# Patient Record
Sex: FEMALE | Race: WHITE | NOT HISPANIC OR LATINO | URBAN - METROPOLITAN AREA
[De-identification: names, ages, dates, MRNs, and addresses within clinical notes are randomized per-mention and may not be internally consistent; named-entity substitution may affect disease eponyms.]

---

## 2020-02-20 ENCOUNTER — OFFICE (OUTPATIENT)
Dept: URBAN - METROPOLITAN AREA CLINIC 33 | Facility: CLINIC | Age: 60
End: 2020-02-20
Payer: OTHER GOVERNMENT

## 2020-02-20 VITALS
HEIGHT: 61 IN | HEART RATE: 64 BPM | SYSTOLIC BLOOD PRESSURE: 133 MMHG | TEMPERATURE: 97.7 F | WEIGHT: 200 LBS | DIASTOLIC BLOOD PRESSURE: 76 MMHG

## 2020-02-20 DIAGNOSIS — R10.11 RIGHT UPPER QUADRANT PAIN: ICD-10-CM

## 2020-02-20 DIAGNOSIS — Z12.11 ENCOUNTER FOR SCREENING FOR MALIGNANT NEOPLASM OF COLON: ICD-10-CM

## 2020-02-20 PROCEDURE — 99203 OFFICE O/P NEW LOW 30 MIN: CPT

## 2020-02-20 NOTE — SERVICEHPINOTES
NUVIA DA SILVA   is a   59  female who presents with RUQ pain and diarrhea. Developed these symptoms about 3 weeks ago. Reports a chronic hx of stomach issues, this was different than regular pain. BRSeen by pcp and had blood work and RUQ u/s. Slightly elevated lipase, 86 U/L, amylase 46, AST 24, ALT 30,  and total bili 0.2, WBC 14.5. She will repeat the lipase and wbc per pcp. BRBeen on low fat and low carb diet. Now, the pain has resolved but still has occasional diarrhea. BRMoves bowel once daily on BSS type 4. Denies blood in stool, melena or lower abdominal pain. BRThe pain is at RUQ. The pain was on both empty stomach and after eating. BRDiarrhea followed after RUQ pain. + Nausea with the pain. Denies vomiting or dysphagia.  BR+ acid reflux, currently controls with diet. Weight loss about 6-10 lbs since 3 weeks ago. Family hx unknown. Takes aspirin 81 mg daily. Denies  hx of cardiovascular disease. BR

## 2020-02-27 ENCOUNTER — OFFICE (OUTPATIENT)
Dept: URBAN - METROPOLITAN AREA PATHOLOGY 17 | Facility: PATHOLOGY | Age: 60
End: 2020-02-27

## 2020-02-27 ENCOUNTER — OFFICE (OUTPATIENT)
Dept: URBAN - METROPOLITAN AREA PATHOLOGY 17 | Facility: PATHOLOGY | Age: 60
End: 2020-02-27
Payer: OTHER GOVERNMENT

## 2020-02-27 ENCOUNTER — OFFICE (OUTPATIENT)
Dept: URBAN - METROPOLITAN AREA CLINIC 32 | Facility: CLINIC | Age: 60
End: 2020-02-27
Payer: OTHER GOVERNMENT

## 2020-02-27 VITALS
SYSTOLIC BLOOD PRESSURE: 89 MMHG | SYSTOLIC BLOOD PRESSURE: 97 MMHG | DIASTOLIC BLOOD PRESSURE: 60 MMHG | SYSTOLIC BLOOD PRESSURE: 139 MMHG | DIASTOLIC BLOOD PRESSURE: 48 MMHG | DIASTOLIC BLOOD PRESSURE: 68 MMHG | OXYGEN SATURATION: 94 % | RESPIRATION RATE: 12 BRPM | TEMPERATURE: 98.1 F | RESPIRATION RATE: 15 BRPM | SYSTOLIC BLOOD PRESSURE: 102 MMHG | DIASTOLIC BLOOD PRESSURE: 55 MMHG | HEIGHT: 61 IN | HEART RATE: 71 BPM | OXYGEN SATURATION: 99 % | RESPIRATION RATE: 11 BRPM | OXYGEN SATURATION: 100 % | SYSTOLIC BLOOD PRESSURE: 125 MMHG | WEIGHT: 200 LBS | DIASTOLIC BLOOD PRESSURE: 73 MMHG | DIASTOLIC BLOOD PRESSURE: 63 MMHG | SYSTOLIC BLOOD PRESSURE: 121 MMHG | HEART RATE: 64 BPM | RESPIRATION RATE: 16 BRPM | OXYGEN SATURATION: 97 % | TEMPERATURE: 98.6 F | HEART RATE: 68 BPM

## 2020-02-27 DIAGNOSIS — K31.7 POLYP OF STOMACH AND DUODENUM: ICD-10-CM

## 2020-02-27 DIAGNOSIS — K20.8 OTHER ESOPHAGITIS: ICD-10-CM

## 2020-02-27 DIAGNOSIS — R11.0 NAUSEA: ICD-10-CM

## 2020-02-27 DIAGNOSIS — R10.11 RIGHT UPPER QUADRANT PAIN: ICD-10-CM

## 2020-02-27 DIAGNOSIS — R10.13 EPIGASTRIC PAIN: ICD-10-CM

## 2020-02-27 DIAGNOSIS — K29.60 OTHER GASTRITIS WITHOUT BLEEDING: ICD-10-CM

## 2020-02-27 PROBLEM — K31.89 OTHER DISEASES OF STOMACH AND DUODENUM: Status: ACTIVE | Noted: 2020-02-27

## 2020-02-27 PROBLEM — K20.9 ESOPHAGITIS, UNSPECIFIED: Status: ACTIVE | Noted: 2020-02-27

## 2020-02-27 PROBLEM — K44.9 DIAPHRAGMATIC HERNIA WITHOUT OBSTRUCTION OR GANGRENE: Status: ACTIVE | Noted: 2020-02-27

## 2020-02-27 PROCEDURE — 88313 SPECIAL STAINS GROUP 2: CPT

## 2020-02-27 PROCEDURE — 43239 EGD BIOPSY SINGLE/MULTIPLE: CPT

## 2020-02-27 PROCEDURE — 88305 TISSUE EXAM BY PATHOLOGIST: CPT

## 2020-02-27 PROCEDURE — 88342 IMHCHEM/IMCYTCHM 1ST ANTB: CPT

## 2020-02-27 PROCEDURE — 00002: CPT

## 2021-12-29 ENCOUNTER — TELEHEALTH PROVIDED OTHER THAN IN PATIENT'S HOME (OUTPATIENT)
Dept: URBAN - METROPOLITAN AREA TELEHEALTH 3 | Facility: TELEHEALTH | Age: 61
End: 2021-12-29
Payer: COMMERCIAL

## 2021-12-29 VITALS — HEIGHT: 61 IN | WEIGHT: 213 LBS

## 2021-12-29 DIAGNOSIS — K92.1 MELENA: ICD-10-CM

## 2021-12-29 DIAGNOSIS — R10.31 RIGHT LOWER QUADRANT PAIN: ICD-10-CM

## 2021-12-29 DIAGNOSIS — K52.9 NONINFECTIVE GASTROENTERITIS AND COLITIS, UNSPECIFIED: ICD-10-CM

## 2021-12-29 PROCEDURE — 99214 OFFICE O/P EST MOD 30 MIN: CPT | Mod: 95 | Performed by: INTERNAL MEDICINE

## 2021-12-29 NOTE — SERVICEHPINOTES
PATIENT VERIFIED BY DATE OF BIRTH AND NAME. Patient has been consented for this telecommunication visit.
br
br Pt presents for evaluation of diarrhea and suspected IBS. Chronic issues, though worse recently. Specifically, diarrhea has been worse over past few weeks. No specific trigger, though otezla (psoriasis) recently added to medical regimen. Normal BM initially, then every 20-30 minutes afterwards lasting a few hours. Loose stool consistency variable volume. No blood in stools, but has had black (formed) stools on rare occasion. No mucous in stools. Urgency with BMs, sometimes has tenesmus. No nocturnal BM. Can be worse after PO intake, no specific food triggers though. Sometimes worse with stress. Has tried bland diet, no specific improvement hasn't tried much else. br
br Associated positive symptoms: diminished appetite, belching, increased abdominal noise, lower abdominal discomfort, dyspepsia, nausea with retching (no vomiting)br Negative symptoms: no fevers, chills, night sweats, weight loss.br
br EGD 2/2020 showed a hiatal hernia and mild, non-erosive gastritis (confirmed on biopsies) biopsies from normal appearing duodenum were normal.
br Labs 2/2020: minimal elevation in lipase and alk phos (just above ULN), otherwise normal CMP CBC showed slight leukocytosis with increased neutrophils and absolute eosinophil count.
br HIDA w/ CCK 2/2020 was normal.
br MABEL US 2/2020 showed hepatic steatosis but otherwise was normal.br
br Colonoscopy 5/2010 showed internal hemorrhoids but was otherwise unremarkable biopsies taken from ileum showed nonspecific chronic ileitis (not IBD).br
br 10-point ROS completed with patient, pertinent positives/negatives outlined above.

## 2022-01-11 LAB
C-REACTIVE PROTEIN: 15.2 MG/L — HIGH (ref ?–8)
CBC (INCLUDES DIFF/PLT): ABSOLUTE BAND NEUTROPHILS: NORMAL CELLS/UL
CBC (INCLUDES DIFF/PLT): ABSOLUTE BASOPHILS: 78 CELLS/UL (ref 0–200)
CBC (INCLUDES DIFF/PLT): ABSOLUTE BLASTS: NORMAL CELLS/UL
CBC (INCLUDES DIFF/PLT): ABSOLUTE EOSINOPHILS: 245 CELLS/UL (ref 15–500)
CBC (INCLUDES DIFF/PLT): ABSOLUTE LYMPHOCYTES: 3146 CELLS/UL (ref 850–3900)
CBC (INCLUDES DIFF/PLT): ABSOLUTE METAMYELOCYTES: NORMAL CELLS/UL
CBC (INCLUDES DIFF/PLT): ABSOLUTE MONOCYTES: 892 CELLS/UL (ref 200–950)
CBC (INCLUDES DIFF/PLT): ABSOLUTE MYELOCYTES: NORMAL CELLS/UL
CBC (INCLUDES DIFF/PLT): ABSOLUTE NEUTROPHILS: 5439 CELLS/UL (ref 1500–7800)
CBC (INCLUDES DIFF/PLT): ABSOLUTE NUCLEATED RBC: NORMAL CELLS/UL
CBC (INCLUDES DIFF/PLT): ABSOLUTE PROMYELOCYTES: NORMAL CELLS/UL
CBC (INCLUDES DIFF/PLT): BAND NEUTROPHILS: NORMAL %
CBC (INCLUDES DIFF/PLT): BASOPHILS: 0.8 %
CBC (INCLUDES DIFF/PLT): BLASTS: NORMAL %
CBC (INCLUDES DIFF/PLT): COMMENT(S): NORMAL
CBC (INCLUDES DIFF/PLT): EOSINOPHILS: 2.5 %
CBC (INCLUDES DIFF/PLT): HEMATOCRIT: 38.3 % (ref 35–45)
CBC (INCLUDES DIFF/PLT): HEMOGLOBIN: 12.7 G/DL (ref 11.7–15.5)
CBC (INCLUDES DIFF/PLT): LYMPHOCYTES: 32.1 %
CBC (INCLUDES DIFF/PLT): MCH: 28.9 PG (ref 27–33)
CBC (INCLUDES DIFF/PLT): MCHC: 33.2 G/DL (ref 32–36)
CBC (INCLUDES DIFF/PLT): MCV: 87.2 FL (ref 80–100)
CBC (INCLUDES DIFF/PLT): METAMYELOCYTES: NORMAL %
CBC (INCLUDES DIFF/PLT): MONOCYTES: 9.1 %
CBC (INCLUDES DIFF/PLT): MPV: 10 FL (ref 7.5–12.5)
CBC (INCLUDES DIFF/PLT): MYELOCYTES: NORMAL %
CBC (INCLUDES DIFF/PLT): NEUTROPHILS: 55.5 %
CBC (INCLUDES DIFF/PLT): NUCLEATED RBC: NORMAL /100 WBC
CBC (INCLUDES DIFF/PLT): PLATELET COUNT: 268 THOUSAND/UL (ref 140–400)
CBC (INCLUDES DIFF/PLT): PROMYELOCYTES: NORMAL %
CBC (INCLUDES DIFF/PLT): RDW: 12.7 % (ref 11–15)
CBC (INCLUDES DIFF/PLT): REACTIVE LYMPHOCYTES: NORMAL %
CBC (INCLUDES DIFF/PLT): RED BLOOD CELL COUNT: 4.39 MILLION/UL (ref 3.8–5.1)
CBC (INCLUDES DIFF/PLT): WHITE BLOOD CELL COUNT: 9.8 THOUSAND/UL (ref 3.8–10.8)
COMPREHENSIVE METABOLIC PANEL: ALBUMIN/GLOBULIN RATIO: 1.8 (CALC) (ref 1–2.5)
COMPREHENSIVE METABOLIC PANEL: ALBUMIN: 4.6 G/DL (ref 3.6–5.1)
COMPREHENSIVE METABOLIC PANEL: ALKALINE PHOSPHATASE: 76 U/L (ref 37–153)
COMPREHENSIVE METABOLIC PANEL: ALT: 51 U/L — HIGH (ref 6–29)
COMPREHENSIVE METABOLIC PANEL: AST: 36 U/L — HIGH (ref 10–35)
COMPREHENSIVE METABOLIC PANEL: BILIRUBIN, TOTAL: 0.4 MG/DL (ref 0.2–1.2)
COMPREHENSIVE METABOLIC PANEL: BUN/CREATININE RATIO: (no result) (CALC)
COMPREHENSIVE METABOLIC PANEL: CALCIUM: 9.9 MG/DL (ref 8.6–10.4)
COMPREHENSIVE METABOLIC PANEL: CARBON DIOXIDE: 30 MMOL/L (ref 20–32)
COMPREHENSIVE METABOLIC PANEL: CHLORIDE: 97 MMOL/L — LOW (ref 98–110)
COMPREHENSIVE METABOLIC PANEL: CREATININE: 0.76 MG/DL (ref 0.5–0.99)
COMPREHENSIVE METABOLIC PANEL: EGFR AFRICAN AMERICAN: 97 ML/MIN/1.73M2 (ref 60–?)
COMPREHENSIVE METABOLIC PANEL: EGFR NON-AFR. AMERICAN: 83 ML/MIN/1.73M2 (ref 60–?)
COMPREHENSIVE METABOLIC PANEL: GLOBULIN: 2.5 G/DL (CALC) (ref 1.9–3.7)
COMPREHENSIVE METABOLIC PANEL: GLUCOSE: 149 MG/DL — HIGH (ref 65–139)
COMPREHENSIVE METABOLIC PANEL: POTASSIUM: 3.9 MMOL/L (ref 3.5–5.3)
COMPREHENSIVE METABOLIC PANEL: PROTEIN, TOTAL: 7.1 G/DL (ref 6.1–8.1)
COMPREHENSIVE METABOLIC PANEL: SODIUM: 138 MMOL/L (ref 135–146)
COMPREHENSIVE METABOLIC PANEL: UREA NITROGEN (BUN): 15 MG/DL (ref 7–25)
LIPASE: 58 U/L (ref 7–60)

## 2022-01-25 LAB
CALPROTECTIN, STOOL: 15 MCG/G
OVA AND PARASITES, CONC AND PERM SMEAR: (no result)
SALMONELLA/SHIGELLA CULT, CAMPY EIA AND SHIGA TOXIN W/RFL E. COLI O157 CULT: CAMPYLOBACTER SPP. AG,EIA: (no result)
SALMONELLA/SHIGELLA CULT, CAMPY EIA AND SHIGA TOXIN W/RFL E. COLI O157 CULT: SALMONELLA AND SHIGELLA, CULTURE: (no result)
SALMONELLA/SHIGELLA CULT, CAMPY EIA AND SHIGA TOXIN W/RFL E. COLI O157 CULT: SHIGA TOXINS, EIA W/RFL TO E.COLI O157 CULTURE: (no result)

## 2022-02-01 ENCOUNTER — OFFICE (OUTPATIENT)
Dept: URBAN - METROPOLITAN AREA PATHOLOGY 18 | Facility: PATHOLOGY | Age: 62
End: 2022-02-01
Payer: COMMERCIAL

## 2022-02-01 ENCOUNTER — OFFICE (OUTPATIENT)
Dept: URBAN - METROPOLITAN AREA CLINIC 30 | Facility: CLINIC | Age: 62
End: 2022-02-01
Payer: COMMERCIAL

## 2022-02-01 VITALS
RESPIRATION RATE: 23 BRPM | DIASTOLIC BLOOD PRESSURE: 84 MMHG | OXYGEN SATURATION: 96 % | DIASTOLIC BLOOD PRESSURE: 98 MMHG | HEART RATE: 64 BPM | RESPIRATION RATE: 19 BRPM | HEART RATE: 69 BPM | HEART RATE: 68 BPM | SYSTOLIC BLOOD PRESSURE: 144 MMHG | DIASTOLIC BLOOD PRESSURE: 59 MMHG | DIASTOLIC BLOOD PRESSURE: 85 MMHG | RESPIRATION RATE: 8 BRPM | SYSTOLIC BLOOD PRESSURE: 180 MMHG | RESPIRATION RATE: 18 BRPM | SYSTOLIC BLOOD PRESSURE: 192 MMHG | SYSTOLIC BLOOD PRESSURE: 142 MMHG | HEART RATE: 80 BPM | TEMPERATURE: 98.8 F | DIASTOLIC BLOOD PRESSURE: 90 MMHG | HEART RATE: 77 BPM | OXYGEN SATURATION: 100 % | SYSTOLIC BLOOD PRESSURE: 166 MMHG | DIASTOLIC BLOOD PRESSURE: 71 MMHG | OXYGEN SATURATION: 97 % | HEART RATE: 78 BPM | RESPIRATION RATE: 16 BRPM | HEIGHT: 61 IN | WEIGHT: 206 LBS | SYSTOLIC BLOOD PRESSURE: 173 MMHG | RESPIRATION RATE: 17 BRPM | DIASTOLIC BLOOD PRESSURE: 58 MMHG | OXYGEN SATURATION: 95 %

## 2022-02-01 DIAGNOSIS — K57.30 DIVERTICULOSIS OF LARGE INTESTINE WITHOUT PERFORATION OR ABS: ICD-10-CM

## 2022-02-01 DIAGNOSIS — D12.8 BENIGN NEOPLASM OF RECTUM: ICD-10-CM

## 2022-02-01 DIAGNOSIS — R19.7 DIARRHEA, UNSPECIFIED: ICD-10-CM

## 2022-02-01 PROBLEM — K62.1 RECTAL POLYP: Status: ACTIVE | Noted: 2022-02-01

## 2022-02-01 PROCEDURE — 88305 TISSUE EXAM BY PATHOLOGIST: CPT | Performed by: PATHOLOGY

## 2022-02-01 NOTE — SERVICEHPINOTES
Pt presents for colonoscopy due to chronic diarrhea. Previous colonoscopy 5/2010 showed internal hemorrhoids but was otherwise unremarkable biopsies taken from ileum showed nonspecific chronic ileitis (not IBD). No family history of IBD or colon cancer.

## 2022-10-26 ENCOUNTER — TELEHEALTH PROVIDED IN PATIENT'S HOME (OUTPATIENT)
Dept: URBAN - METROPOLITAN AREA TELEHEALTH 12 | Facility: TELEHEALTH | Age: 62
End: 2022-10-26
Payer: COMMERCIAL

## 2022-10-26 VITALS — WEIGHT: 193 LBS | HEIGHT: 61 IN

## 2022-10-26 DIAGNOSIS — R11.2 NAUSEA WITH VOMITING, UNSPECIFIED: ICD-10-CM

## 2022-10-26 DIAGNOSIS — K21.00 GASTRO-ESOPHAGEAL REFLUX DISEASE WITH ESOPHAGITIS, WITHOUT B: ICD-10-CM

## 2022-10-26 DIAGNOSIS — E11.69 TYPE 2 DIABETES MELLITUS WITH OTHER SPECIFIED COMPLICATION: ICD-10-CM

## 2022-10-26 PROCEDURE — 99214 OFFICE O/P EST MOD 30 MIN: CPT | Mod: 95 | Performed by: INTERNAL MEDICINE

## 2022-10-26 RX ORDER — OMEPRAZOLE 40 MG/1
CAPSULE, DELAYED RELEASE ORAL
Qty: 30 | Refills: 5 | Status: COMPLETED
Start: 2022-10-26 | End: 2023-06-06

## 2022-10-26 NOTE — SERVICEHPINOTES
PATIENT VERIFIED BY DATE OF BIRTH AND NAME. Patient has been consented for this telecommunication visit.
eyal birch Ms. Martinez is a 61 yo WF w/ PMhx of obesity, HTN, DM II, here for f/u to discuss intermittent nausea and vomiting.  She's been noticing early satiety, bloating, nausea.  She does not have typical reflux symptoms.  She nausea can be early in AM or in middle of night.  No hematemesis.  + bloating, distension.  She went to ER for this and was given Zofran - she felt it made her lethargic.  She thinks here HA1c ranges from 6.5 to 7.5 range.  She does get post-nasal drip and sinus issues, and has seen Dr. Barton many years back for this.  She also has "terrible seasonal allergies," has seen Allergist for this.eyal birch Past GI w/u: EGD on 2/27/2020 revealed esophagitis, hiatal hernia, gastritis.  She was placed on PPI Rx which she thinks helped at that time.  Not taking RX now.  No lower GI complaints. eyal birch

## 2022-11-18 ENCOUNTER — TELEHEALTH PROVIDED IN PATIENT'S HOME (OUTPATIENT)
Dept: URBAN - METROPOLITAN AREA TELEHEALTH 7 | Facility: TELEHEALTH | Age: 62
End: 2022-11-18
Payer: COMMERCIAL

## 2022-11-18 VITALS — WEIGHT: 180 LBS | HEIGHT: 61 IN

## 2022-11-18 DIAGNOSIS — R11.2 NAUSEA WITH VOMITING, UNSPECIFIED: ICD-10-CM

## 2022-11-18 DIAGNOSIS — K21.9 GASTRO-ESOPHAGEAL REFLUX DISEASE WITHOUT ESOPHAGITIS: ICD-10-CM

## 2022-11-18 DIAGNOSIS — K31.84 GASTROPARESIS: ICD-10-CM

## 2022-11-18 PROCEDURE — 99214 OFFICE O/P EST MOD 30 MIN: CPT | Mod: 95 | Performed by: INTERNAL MEDICINE

## 2022-11-18 RX ORDER — METOCLOPRAMIDE HYDROCHLORIDE 5 MG/1
TABLET ORAL
Qty: 90 | Refills: 0 | Status: COMPLETED
End: 2023-06-06

## 2022-11-18 RX ORDER — OMEPRAZOLE 20 MG/1
CAPSULE, DELAYED RELEASE ORAL
Qty: 60 | Refills: 5 | Status: ACTIVE

## 2022-11-18 NOTE — SERVICEHPINOTES
PATIENT VERIFIED BY DATE OF BIRTH AND NAME. Patient has been consented for this telecommunication visit.   61 yo f recently diagnosed with gastroparesis during a recent admission to [facility].  She had a normal EGD and was started on reglan 10 mg po tid ac October 5 with relief of her nausea and vomiting.   I explained to her the side effects of long term reglan  use and she understands.  She has been taking Omeprazole 40 mg po qd since her office visit 10/26/22.  She has noticed more coughing with excessive mucus that can lead to vomiting.

## 2023-06-06 ENCOUNTER — TELEHEALTH PROVIDED OTHER THAN IN PATIENT'S HOME (OUTPATIENT)
Dept: URBAN - METROPOLITAN AREA TELEHEALTH 12 | Facility: TELEHEALTH | Age: 63
End: 2023-06-06

## 2023-06-06 VITALS — WEIGHT: 158 LBS | HEIGHT: 61 IN

## 2023-06-06 DIAGNOSIS — K21.9 GASTRO-ESOPHAGEAL REFLUX DISEASE WITHOUT ESOPHAGITIS: ICD-10-CM

## 2023-06-06 DIAGNOSIS — E11.69 TYPE 2 DIABETES MELLITUS WITH OTHER SPECIFIED COMPLICATION: ICD-10-CM

## 2023-06-06 DIAGNOSIS — K31.84 GASTROPARESIS: ICD-10-CM

## 2023-06-06 PROCEDURE — 99214 OFFICE O/P EST MOD 30 MIN: CPT | Mod: 95 | Performed by: INTERNAL MEDICINE
